# Patient Record
Sex: FEMALE | Race: WHITE | Employment: OTHER | URBAN - METROPOLITAN AREA
[De-identification: names, ages, dates, MRNs, and addresses within clinical notes are randomized per-mention and may not be internally consistent; named-entity substitution may affect disease eponyms.]

---

## 2020-06-20 ENCOUNTER — APPOINTMENT (OUTPATIENT)
Dept: GENERAL RADIOLOGY | Age: 55
DRG: 690 | End: 2020-06-20
Payer: COMMERCIAL

## 2020-06-20 ENCOUNTER — HOSPITAL ENCOUNTER (INPATIENT)
Age: 55
LOS: 2 days | Discharge: HOME | DRG: 690 | End: 2020-06-23
Attending: INTERNAL MEDICINE | Admitting: INTERNAL MEDICINE
Payer: COMMERCIAL

## 2020-06-20 ENCOUNTER — APPOINTMENT (OUTPATIENT)
Dept: CT IMAGING | Age: 55
DRG: 690 | End: 2020-06-20
Payer: COMMERCIAL

## 2020-06-20 LAB
A/G RATIO: 0.8 (ref 1.1–2.2)
ALBUMIN SERPL-MCNC: 2.9 G/DL (ref 3.4–5)
ALP BLD-CCNC: 116 U/L (ref 40–129)
ALT SERPL-CCNC: 12 U/L (ref 10–40)
ANION GAP SERPL CALCULATED.3IONS-SCNC: 11 MMOL/L (ref 3–16)
ANISOCYTOSIS: ABNORMAL
AST SERPL-CCNC: 18 U/L (ref 15–37)
BACTERIA: ABNORMAL /HPF
BANDED NEUTROPHILS RELATIVE PERCENT: 4 % (ref 0–7)
BASOPHILS ABSOLUTE: 0 K/UL (ref 0–0.2)
BASOPHILS RELATIVE PERCENT: 0 %
BILIRUB SERPL-MCNC: 0.9 MG/DL (ref 0–1)
BILIRUBIN URINE: NEGATIVE
BLOOD, URINE: ABNORMAL
BUN BLDV-MCNC: 16 MG/DL (ref 7–20)
CALCIUM SERPL-MCNC: 8.2 MG/DL (ref 8.3–10.6)
CHLORIDE BLD-SCNC: 99 MMOL/L (ref 99–110)
CLARITY: ABNORMAL
CO2: 21 MMOL/L (ref 21–32)
COLOR: ABNORMAL
CREAT SERPL-MCNC: 0.7 MG/DL (ref 0.6–1.1)
EOSINOPHILS ABSOLUTE: 0 K/UL (ref 0–0.6)
EOSINOPHILS RELATIVE PERCENT: 0 %
EPITHELIAL CELLS, UA: ABNORMAL /HPF (ref 0–5)
GFR AFRICAN AMERICAN: >60
GFR NON-AFRICAN AMERICAN: >60
GLOBULIN: 3.8 G/DL
GLUCOSE BLD-MCNC: 219 MG/DL (ref 70–99)
GLUCOSE URINE: NEGATIVE MG/DL
HCT VFR BLD CALC: 42.4 % (ref 36–48)
HEMOGLOBIN: 14.2 G/DL (ref 12–16)
HYALINE CASTS: ABNORMAL /LPF (ref 0–2)
KETONES, URINE: NEGATIVE MG/DL
LACTIC ACID, SEPSIS: 1.1 MMOL/L (ref 0.4–1.9)
LEUKOCYTE ESTERASE, URINE: ABNORMAL
LYMPHOCYTES ABSOLUTE: 0.5 K/UL (ref 1–5.1)
LYMPHOCYTES RELATIVE PERCENT: 4 %
MAGNESIUM: 1.9 MG/DL (ref 1.8–2.4)
MCH RBC QN AUTO: 30.5 PG (ref 26–34)
MCHC RBC AUTO-ENTMCNC: 33.4 G/DL (ref 31–36)
MCV RBC AUTO: 91.2 FL (ref 80–100)
MICROSCOPIC EXAMINATION: YES
MONOCYTES ABSOLUTE: 1.2 K/UL (ref 0–1.3)
MONOCYTES RELATIVE PERCENT: 9 %
NEUTROPHILS ABSOLUTE: 11.1 K/UL (ref 1.7–7.7)
NEUTROPHILS RELATIVE PERCENT: 83 %
NITRITE, URINE: POSITIVE
PDW BLD-RTO: 12.7 % (ref 12.4–15.4)
PH UA: 6 (ref 5–8)
PLATELET # BLD: 81 K/UL (ref 135–450)
PLATELET SLIDE REVIEW: ABNORMAL
PMV BLD AUTO: 9.1 FL (ref 5–10.5)
POTASSIUM SERPL-SCNC: 3.2 MMOL/L (ref 3.5–5.1)
PROTEIN UA: 100 MG/DL
RBC # BLD: 4.65 M/UL (ref 4–5.2)
RBC UA: ABNORMAL /HPF (ref 0–4)
SLIDE REVIEW: ABNORMAL
SODIUM BLD-SCNC: 131 MMOL/L (ref 136–145)
SPECIFIC GRAVITY UA: 1.02 (ref 1–1.03)
TOTAL PROTEIN: 6.7 G/DL (ref 6.4–8.2)
TROPONIN: <0.01 NG/ML
URINE REFLEX TO CULTURE: YES
URINE TYPE: ABNORMAL
UROBILINOGEN, URINE: 0.2 E.U./DL
WBC # BLD: 12.8 K/UL (ref 4–11)
WBC UA: ABNORMAL /HPF (ref 0–5)

## 2020-06-20 PROCEDURE — 84443 ASSAY THYROID STIM HORMONE: CPT

## 2020-06-20 PROCEDURE — 2580000003 HC RX 258: Performed by: PHYSICIAN ASSISTANT

## 2020-06-20 PROCEDURE — 83605 ASSAY OF LACTIC ACID: CPT

## 2020-06-20 PROCEDURE — 96374 THER/PROPH/DIAG INJ IV PUSH: CPT

## 2020-06-20 PROCEDURE — 99291 CRITICAL CARE FIRST HOUR: CPT

## 2020-06-20 PROCEDURE — 84145 PROCALCITONIN (PCT): CPT

## 2020-06-20 PROCEDURE — 93005 ELECTROCARDIOGRAM TRACING: CPT | Performed by: EMERGENCY MEDICINE

## 2020-06-20 PROCEDURE — 85025 COMPLETE CBC W/AUTO DIFF WBC: CPT

## 2020-06-20 PROCEDURE — 71045 X-RAY EXAM CHEST 1 VIEW: CPT

## 2020-06-20 PROCEDURE — 81001 URINALYSIS AUTO W/SCOPE: CPT

## 2020-06-20 PROCEDURE — 87077 CULTURE AEROBIC IDENTIFY: CPT

## 2020-06-20 PROCEDURE — 83735 ASSAY OF MAGNESIUM: CPT

## 2020-06-20 PROCEDURE — 6360000002 HC RX W HCPCS: Performed by: PHYSICIAN ASSISTANT

## 2020-06-20 PROCEDURE — 74176 CT ABD & PELVIS W/O CONTRAST: CPT

## 2020-06-20 PROCEDURE — 80053 COMPREHEN METABOLIC PANEL: CPT

## 2020-06-20 PROCEDURE — 87186 SC STD MICRODIL/AGAR DIL: CPT

## 2020-06-20 PROCEDURE — 96375 TX/PRO/DX INJ NEW DRUG ADDON: CPT

## 2020-06-20 PROCEDURE — 96361 HYDRATE IV INFUSION ADD-ON: CPT

## 2020-06-20 PROCEDURE — 87086 URINE CULTURE/COLONY COUNT: CPT

## 2020-06-20 PROCEDURE — 84484 ASSAY OF TROPONIN QUANT: CPT

## 2020-06-20 RX ORDER — 0.9 % SODIUM CHLORIDE 0.9 %
1000 INTRAVENOUS SOLUTION INTRAVENOUS ONCE
Status: COMPLETED | OUTPATIENT
Start: 2020-06-20 | End: 2020-06-21

## 2020-06-20 RX ORDER — POTASSIUM CHLORIDE 20 MEQ/1
40 TABLET, EXTENDED RELEASE ORAL ONCE
Status: COMPLETED | OUTPATIENT
Start: 2020-06-20 | End: 2020-06-21

## 2020-06-20 RX ORDER — MELOXICAM 15 MG/1
15 TABLET ORAL DAILY
COMMUNITY

## 2020-06-20 RX ORDER — PREDNISONE 20 MG/1
20 TABLET ORAL DAILY
Status: ON HOLD | COMMUNITY
End: 2020-06-23 | Stop reason: HOSPADM

## 2020-06-20 RX ORDER — DIPHENHYDRAMINE HCL 25 MG
25 CAPSULE ORAL NIGHTLY PRN
COMMUNITY

## 2020-06-20 RX ORDER — ONDANSETRON 2 MG/ML
4 INJECTION INTRAMUSCULAR; INTRAVENOUS EVERY 30 MIN PRN
Status: DISCONTINUED | OUTPATIENT
Start: 2020-06-20 | End: 2020-06-21 | Stop reason: HOSPADM

## 2020-06-20 RX ADMIN — Medication 1 G: at 23:58

## 2020-06-20 RX ADMIN — SODIUM CHLORIDE 1000 ML: 9 INJECTION, SOLUTION INTRAVENOUS at 22:10

## 2020-06-20 RX ADMIN — SODIUM CHLORIDE 1000 ML: 9 INJECTION, SOLUTION INTRAVENOUS at 23:58

## 2020-06-20 RX ADMIN — ONDANSETRON 4 MG: 2 INJECTION INTRAMUSCULAR; INTRAVENOUS at 22:10

## 2020-06-20 ASSESSMENT — PAIN SCALES - GENERAL: PAINLEVEL_OUTOF10: 10

## 2020-06-21 PROBLEM — E87.1 HYPONATREMIA: Status: ACTIVE | Noted: 2020-06-21

## 2020-06-21 PROBLEM — E87.6 HYPOKALEMIA: Status: ACTIVE | Noted: 2020-06-21

## 2020-06-21 PROBLEM — R53.1 GENERALIZED WEAKNESS: Status: ACTIVE | Noted: 2020-06-21

## 2020-06-21 PROBLEM — R82.90 ABNORMAL URINALYSIS: Status: ACTIVE | Noted: 2020-06-21

## 2020-06-21 PROBLEM — D72.829 LEUKOCYTOSIS: Status: ACTIVE | Noted: 2020-06-21

## 2020-06-21 PROBLEM — K52.9 ACUTE GASTROENTERITIS: Status: ACTIVE | Noted: 2020-06-21

## 2020-06-21 LAB
ALBUMIN SERPL-MCNC: 2.9 G/DL (ref 3.4–5)
ANION GAP SERPL CALCULATED.3IONS-SCNC: 6 MMOL/L (ref 3–16)
BUN BLDV-MCNC: 10 MG/DL (ref 7–20)
C DIFF TOXIN/ANTIGEN: NORMAL
CALCIUM SERPL-MCNC: 8.4 MG/DL (ref 8.3–10.6)
CHLORIDE BLD-SCNC: 106 MMOL/L (ref 99–110)
CO2: 25 MMOL/L (ref 21–32)
CREAT SERPL-MCNC: 0.8 MG/DL (ref 0.6–1.1)
EKG ATRIAL RATE: 128 BPM
EKG DIAGNOSIS: NORMAL
EKG P AXIS: 22 DEGREES
EKG P-R INTERVAL: 140 MS
EKG Q-T INTERVAL: 308 MS
EKG QRS DURATION: 106 MS
EKG QTC CALCULATION (BAZETT): 449 MS
EKG R AXIS: -58 DEGREES
EKG T AXIS: 55 DEGREES
EKG VENTRICULAR RATE: 128 BPM
GFR AFRICAN AMERICAN: >60
GFR NON-AFRICAN AMERICAN: >60
GI BACTERIAL PATHOGENS BY PCR: NORMAL
GLUCOSE BLD-MCNC: 106 MG/DL (ref 70–99)
HCT VFR BLD CALC: 40.7 % (ref 36–48)
HEMOGLOBIN: 13.6 G/DL (ref 12–16)
MAGNESIUM: 1.9 MG/DL (ref 1.8–2.4)
MAGNESIUM: 2.1 MG/DL (ref 1.8–2.4)
MCH RBC QN AUTO: 30.9 PG (ref 26–34)
MCHC RBC AUTO-ENTMCNC: 33.5 G/DL (ref 31–36)
MCV RBC AUTO: 92.2 FL (ref 80–100)
PDW BLD-RTO: 13.3 % (ref 12.4–15.4)
PHOSPHORUS: 2.2 MG/DL (ref 2.5–4.9)
PLATELET # BLD: 82 K/UL (ref 135–450)
PMV BLD AUTO: 9 FL (ref 5–10.5)
POTASSIUM SERPL-SCNC: 4.3 MMOL/L (ref 3.5–5.1)
PROCALCITONIN: 4.06 NG/ML (ref 0–0.15)
PROCALCITONIN: 5.61 NG/ML (ref 0–0.15)
RBC # BLD: 4.41 M/UL (ref 4–5.2)
REPORT: NORMAL
SODIUM BLD-SCNC: 137 MMOL/L (ref 136–145)
TSH REFLEX: 0.82 UIU/ML (ref 0.27–4.2)
WBC # BLD: 13.5 K/UL (ref 4–11)

## 2020-06-21 PROCEDURE — 93010 ELECTROCARDIOGRAM REPORT: CPT | Performed by: INTERNAL MEDICINE

## 2020-06-21 PROCEDURE — 6370000000 HC RX 637 (ALT 250 FOR IP): Performed by: NURSE PRACTITIONER

## 2020-06-21 PROCEDURE — 6360000002 HC RX W HCPCS: Performed by: INTERNAL MEDICINE

## 2020-06-21 PROCEDURE — 2580000003 HC RX 258: Performed by: NURSE PRACTITIONER

## 2020-06-21 PROCEDURE — 87040 BLOOD CULTURE FOR BACTERIA: CPT

## 2020-06-21 PROCEDURE — 87150 DNA/RNA AMPLIFIED PROBE: CPT

## 2020-06-21 PROCEDURE — 87186 SC STD MICRODIL/AGAR DIL: CPT

## 2020-06-21 PROCEDURE — 6370000000 HC RX 637 (ALT 250 FOR IP): Performed by: INTERNAL MEDICINE

## 2020-06-21 PROCEDURE — 99255 IP/OBS CONSLTJ NEW/EST HI 80: CPT | Performed by: INTERNAL MEDICINE

## 2020-06-21 PROCEDURE — 80069 RENAL FUNCTION PANEL: CPT

## 2020-06-21 PROCEDURE — 6360000002 HC RX W HCPCS: Performed by: NURSE PRACTITIONER

## 2020-06-21 PROCEDURE — 85027 COMPLETE CBC AUTOMATED: CPT

## 2020-06-21 PROCEDURE — 87324 CLOSTRIDIUM AG IA: CPT

## 2020-06-21 PROCEDURE — 87505 NFCT AGENT DETECTION GI: CPT

## 2020-06-21 PROCEDURE — 84145 PROCALCITONIN (PCT): CPT

## 2020-06-21 PROCEDURE — 83735 ASSAY OF MAGNESIUM: CPT

## 2020-06-21 PROCEDURE — 87449 NOS EACH ORGANISM AG IA: CPT

## 2020-06-21 PROCEDURE — 36415 COLL VENOUS BLD VENIPUNCTURE: CPT

## 2020-06-21 PROCEDURE — 1200000000 HC SEMI PRIVATE

## 2020-06-21 PROCEDURE — 6370000000 HC RX 637 (ALT 250 FOR IP): Performed by: PHYSICIAN ASSISTANT

## 2020-06-21 RX ORDER — SODIUM CHLORIDE 9 MG/ML
INJECTION, SOLUTION INTRAVENOUS CONTINUOUS
Status: CANCELLED | OUTPATIENT
Start: 2020-06-21

## 2020-06-21 RX ORDER — ACETAMINOPHEN 650 MG/1
650 SUPPOSITORY RECTAL EVERY 6 HOURS PRN
Status: DISCONTINUED | OUTPATIENT
Start: 2020-06-21 | End: 2020-06-23 | Stop reason: HOSPADM

## 2020-06-21 RX ORDER — 0.9 % SODIUM CHLORIDE 0.9 %
2000 INTRAVENOUS SOLUTION INTRAVENOUS ONCE
Status: DISCONTINUED | OUTPATIENT
Start: 2020-06-21 | End: 2020-06-21

## 2020-06-21 RX ORDER — LACTOBACILLUS RHAMNOSUS GG 10B CELL
1 CAPSULE ORAL 2 TIMES DAILY WITH MEALS
Status: DISCONTINUED | OUTPATIENT
Start: 2020-06-21 | End: 2020-06-23 | Stop reason: HOSPADM

## 2020-06-21 RX ORDER — SODIUM CHLORIDE 0.9 % (FLUSH) 0.9 %
10 SYRINGE (ML) INJECTION EVERY 12 HOURS SCHEDULED
Status: DISCONTINUED | OUTPATIENT
Start: 2020-06-21 | End: 2020-06-23 | Stop reason: HOSPADM

## 2020-06-21 RX ORDER — POTASSIUM CHLORIDE AND SODIUM CHLORIDE 900; 300 MG/100ML; MG/100ML
INJECTION, SOLUTION INTRAVENOUS CONTINUOUS
Status: DISPENSED | OUTPATIENT
Start: 2020-06-21 | End: 2020-06-22

## 2020-06-21 RX ORDER — PROMETHAZINE HYDROCHLORIDE 25 MG/1
12.5 TABLET ORAL EVERY 6 HOURS PRN
Status: DISCONTINUED | OUTPATIENT
Start: 2020-06-21 | End: 2020-06-23 | Stop reason: HOSPADM

## 2020-06-21 RX ORDER — ACETAMINOPHEN 325 MG/1
650 TABLET ORAL EVERY 6 HOURS PRN
Status: DISCONTINUED | OUTPATIENT
Start: 2020-06-21 | End: 2020-06-23 | Stop reason: HOSPADM

## 2020-06-21 RX ORDER — POLYETHYLENE GLYCOL 3350 17 G/17G
17 POWDER, FOR SOLUTION ORAL DAILY PRN
Status: DISCONTINUED | OUTPATIENT
Start: 2020-06-21 | End: 2020-06-23 | Stop reason: HOSPADM

## 2020-06-21 RX ORDER — SODIUM CHLORIDE 0.9 % (FLUSH) 0.9 %
10 SYRINGE (ML) INJECTION PRN
Status: DISCONTINUED | OUTPATIENT
Start: 2020-06-21 | End: 2020-06-23 | Stop reason: HOSPADM

## 2020-06-21 RX ORDER — ONDANSETRON 2 MG/ML
4 INJECTION INTRAMUSCULAR; INTRAVENOUS EVERY 6 HOURS PRN
Status: DISCONTINUED | OUTPATIENT
Start: 2020-06-21 | End: 2020-06-23 | Stop reason: HOSPADM

## 2020-06-21 RX ADMIN — ONDANSETRON 4 MG: 2 INJECTION INTRAMUSCULAR; INTRAVENOUS at 12:39

## 2020-06-21 RX ADMIN — Medication 1 CAPSULE: at 17:29

## 2020-06-21 RX ADMIN — ENOXAPARIN SODIUM 40 MG: 40 INJECTION SUBCUTANEOUS at 08:23

## 2020-06-21 RX ADMIN — POTASSIUM CHLORIDE AND SODIUM CHLORIDE: 900; 300 INJECTION, SOLUTION INTRAVENOUS at 02:00

## 2020-06-21 RX ADMIN — POTASSIUM CHLORIDE 40 MEQ: 1500 TABLET, EXTENDED RELEASE ORAL at 00:43

## 2020-06-21 RX ADMIN — CEFTRIAXONE 2 G: 2 INJECTION, POWDER, FOR SOLUTION INTRAMUSCULAR; INTRAVENOUS at 17:30

## 2020-06-21 RX ADMIN — ACETAMINOPHEN 650 MG: 325 TABLET, FILM COATED ORAL at 15:59

## 2020-06-21 RX ADMIN — POTASSIUM CHLORIDE AND SODIUM CHLORIDE: 900; 300 INJECTION, SOLUTION INTRAVENOUS at 10:00

## 2020-06-21 ASSESSMENT — ENCOUNTER SYMPTOMS
RHINORRHEA: 0
FACIAL SWELLING: 0
APNEA: 0
BLOOD IN STOOL: 0
DIARRHEA: 1
NAUSEA: 0
COLOR CHANGE: 0
EYE REDNESS: 0
EYE DISCHARGE: 0
PHOTOPHOBIA: 0
COUGH: 0
TROUBLE SWALLOWING: 0
SHORTNESS OF BREATH: 0
ABDOMINAL PAIN: 0
CHEST TIGHTNESS: 0
CHOKING: 0
STRIDOR: 0

## 2020-06-21 ASSESSMENT — PAIN SCALES - GENERAL
PAINLEVEL_OUTOF10: 8
PAINLEVEL_OUTOF10: 0

## 2020-06-21 ASSESSMENT — PAIN DESCRIPTION - PAIN TYPE: TYPE: ACUTE PAIN

## 2020-06-21 NOTE — ED PROVIDER NOTES
[facility]        Pt Name: Christal Bains  MRN: [id number]  [place] 1965  Date of evaluation: 6/20/2020  Provider: Dmitri Monzon PA-C  PCP: Jessica Langford MD    Evaluation by OLIVIER. My supervising physician was available for consultation. The total critical care time spent while evaluating and treating this patient was at least 32 minutes. This excludes time spent doing separately billable procedures. This includes time at the bedside, data interpretation, medication management, obtaining critical history from collateral sources if the patient is unable to provide it directly, and physician consultation. Specifics of interventions taken and potentially life-threatening diagnostic considerations are listed above in the medical decision making. CHIEF COMPLAINT       Chief Complaint   Patient presents with    Fatigue     pt states on prednisone for poison ivy, very allergic, has neause, dehydrations, dry mouth and generally feels fatigued and sick       HISTORY OF PRESENT ILLNESS   (Location, Timing/Onset, Context/Setting, Quality, Duration, Modifying Factors, Severity, Associated Signs and Symptoms)  Note limiting factors. Christal Bains is a 60 y.o. female that presents emergency department with a chief complaint of nausea, dry mouth, polydipsia, general fatigue, chills and diarrhea. Patient states last weekend she was camping and got poison ivy and was started on prednisone, Benadryl and Pepcid 5 days ago. She states she stopped taking the prednisone yesterday because for the past 3 days she has been having the symptoms. She states she has had decreased appetite. She denies chest pain, shortness of breath, cough, known fevers, dysuria, hematuria, bloody stool, abdominal pain or any associated pain. States she is just feeling fatigued. She states that her blood sugar in squad was 221. She is not currently on medication for diabetes.   Denies any other symptoms. Nursing Notes were all reviewed and agreed with or any disagreements were addressed in the HPI. REVIEW OF SYSTEMS    (2-9 systems for level 4, 10 or more for level 5)     Review of Systems    Positives and Pertinent negatives as per HPI. Except as noted above in the ROS, all other systems were reviewed and negative. PAST MEDICAL HISTORY   History reviewed. No pertinent past medical history. SURGICAL HISTORY   History reviewed. No pertinent surgical history. [address]       Current Discharge Medication List      CONTINUE these medications which have NOT CHANGED    Details   predniSONE (DELTASONE) 20 MG tablet Take 20 mg by mouth daily      meloxicam (MOBIC) 15 MG tablet Take 15 mg by mouth daily      diphenhydrAMINE (BENADRYL) 25 MG capsule Take 25 mg by mouth nightly as needed for Itching      NONFORMULARY Cream for posion ivy               ALLERGIES     Codeine    FAMILYHISTORY     History reviewed. No pertinent family history. SOCIAL HISTORY       Social History     Tobacco Use    Smoking status: Never Smoker    Smokeless tobacco: Never Used   Substance Use Topics    Alcohol use: Not Currently    Drug use: Never       SCREENINGS    [place] Coma Scale  Eye Opening: Spontaneous  Best Verbal Response: Oriented  Best Motor Response: Obeys commands  Letty Coma Scale Score: 15        PHYSICAL EXAM    (up to 7 for level 4, 8 or more for level 5)     ED Triage Vitals [06/20/20 2139]   BP Temp Temp Source Pulse Resp SpO2 Height Weight   (!) 152/68 99.6 °F (37.6 °C) Oral 132 18 95 % 5' 8\" (1.727 m) 230 lb (104.3 kg)       Physical Exam  Vitals signs and nursing note reviewed. Constitutional:       Appearance: She is well-developed. She is not diaphoretic. HENT:      Head: Atraumatic. Nose: Nose normal.      Mouth/Throat:      Mouth: Mucous membranes are dry. Eyes:      General:         Right eye: No discharge. Left eye: No discharge.    Neck: Musculoskeletal: Normal range of motion. Cardiovascular:      Rate and Rhythm: Regular rhythm. Tachycardia present. Heart sounds: No murmur. No friction rub. No gallop. Pulmonary:      Effort: Pulmonary effort is normal. No respiratory distress. Breath sounds: No stridor. No wheezing, rhonchi or rales. Abdominal:      General: Bowel sounds are normal. There is no distension. Palpations: Abdomen is soft. There is no mass. Tenderness: There is no abdominal tenderness. There is no guarding or rebound. Hernia: No hernia is present. Musculoskeletal: Normal range of motion. General: No swelling. Skin:     General: Skin is warm and dry. Findings: No erythema or rash. Neurological:      Mental Status: She is alert and oriented to person, place, and time. Cranial Nerves: No cranial nerve deficit.    Psychiatric:         Behavior: Behavior normal.         DIAGNOSTIC RESULTS   LABS:    Labs Reviewed   CBC WITH AUTO DIFFERENTIAL - Abnormal; Notable for the following components:       Result Value    WBC 12.8 (*)     Platelets 81 (*)     Neutrophils Absolute 11.1 (*)     Lymphocytes Absolute 0.5 (*)     Anisocytosis Occasional (*)     All other components within normal limits    Narrative:     Performed at:  [facility]  [address] [facility], [address]   Phone [phone]   COMPREHENSIVE METABOLIC PANEL - Abnormal; Notable for the following components:    Sodium 131 (*)     Potassium 3.2 (*)     Glucose 219 (*)     Calcium 8.2 (*)     Alb 2.9 (*)     Albumin/Globulin Ratio 0.8 (*)     All other components within normal limits    Narrative:     Performed at:  [facility]  [address] [facility], [address]   Phone [phone]   URINE RT REFLEX TO CULTURE - Abnormal; Notable for the following components:    Blood, Urine MODERATE (*)     Protein,  (*)     Nitrite, Urine POSITIVE (*) Leukocyte Esterase, Urine SMALL (*)     All other components within normal limits    Narrative:     Performed at:  [facility] [address] [address] [place]   Phone [phone]   MICROSCOPIC URINALYSIS - Abnormal; Notable for the following components:    WBC, UA 10-20 (*)     RBC, UA 5-10 (*)     Bacteria, UA 1+ (*)     All other components within normal limits    Narrative:     Performed at:  [facility] [address] [address] [place]   Phone [phone]   PROCALCITONIN - Abnormal; Notable for the following components:    Procalcitonin 4.06 (*)     All other components within normal limits    Narrative:     Performed at:  [facility] [address] [address] [place]   Phone [phone]   CULTURE, URINE   CULTURE, BLOOD 2   CULTURE, BLOOD 1   GASTROINTESTINAL PANEL, MOLECULAR   C DIFF TOXIN/ANTIGEN   LACTATE, SEPSIS    Narrative:     Performed at:  [facility] [address] [address] [place]   Phone [phone]   TROPONIN    Narrative:     Performed at:  [facility] [address], [place], [address] [place]   Phone [phone]   MAGNESIUM    Narrative:     Performed at:  [facility] [address] [place] [address] [place]   Phone [phone]   MAGNESIUM    Narrative:     Performed at:  [facility] [address], [place], [address] [place]   Phone [phone]   TSH WITH REFLEX   CBC   RENAL FUNCTION PANEL   MAGNESIUM   PROCALCITONIN       All other labs were within normal range or not returned as of this dictation. EKG: All EKG's are interpreted by the Emergency Department Physician in the absence of a cardiologist.  Please see their note for interpretation of EKG.       RADIOLOGY:   Non-plain film images such as CT, Ultrasound and MRI are read by the radiologist. Raf Handing radiographic images are visualized and preliminarily interpreted by the ED Provider with the below findings:        Interpretation per the Radiologist below, if available at the time of this note:    CT ABDOMEN PELVIS WO CONTRAST   Preliminary Result   Bilateral perinephric edema and prominence of the renal collecting systems   right greater than left without obstructing lesion or calculus. Findings may   represent pyelonephritis. Please correlate with urinalysis and CBC. 9   diverticulosis without evidence of diverticulitis. XR CHEST PORTABLE   Final Result   Prominence central vessels likely magnified by technique. No focal airspace consolidation.                  PROCEDURES   Unless otherwise noted below, none     Procedures    CRITICAL CARE TIME   N/A    CONSULTS: Dr. Ruthie Herrmann and DIFFERENTIAL DIAGNOSIS/MDM:   Vitals:    Vitals:    06/20/20 2330 06/21/20 0000 06/21/20 0030 06/21/20 0100   BP: (!) 90/59 (!) 100/47 (!) 102/52 114/77   Pulse: 113 108 103 101   Resp: 22 19 20 20   Temp:    98.4 °F (36.9 °C)   TempSrc:    Oral   SpO2: 95% 95% 95% 94%   Weight:    229 lb 3 oz (104 kg)   Height:    5' 8\" (1.727 m)       Patient was given the following medications:  Medications   sodium chloride flush 0.9 % injection 10 mL (has no administration in time range)   sodium chloride flush 0.9 % injection 10 mL (has no administration in time range)   acetaminophen (TYLENOL) tablet 650 mg (has no administration in time range)     Or   acetaminophen (TYLENOL) suppository 650 mg (has no administration in time range)   polyethylene glycol (GLYCOLAX) packet 17 g (has no administration in time range)   promethazine (PHENERGAN) tablet 12.5 mg (has no administration in time range)     Or   ondansetron (ZOFRAN) injection 4 mg (has no administration in time range)   enoxaparin (LOVENOX) injection 40 mg (has no administration in time range) DISPOSITION/PLAN   DISPOSITION Admitted 06/21/2020 12:57:27 AM      PATIENT REFERREDTO:  Yuli Sepulveda MD  [address] [place] [address],[address] [facility] [address] [id number] [phone]            DISCHARGE MEDICATIONS:  Current Discharge Medication List          DISCONTINUED MEDICATIONS:  Current Discharge Medication List                 (Please note that portions of this note were completed with a voice recognition program.  Efforts were made to edit the dictations but occasionally words are mis-transcribed.)    Kris Weaver PA-C (electronically signed)            Kris Weaver PA-C  06/21/20 0231

## 2020-06-21 NOTE — CONSULTS
Infectious Diseases   Consult Note        Admission Date: 6/20/2020  Hospital Day: Hospital Day: 2   Attending: Anyi Evangelista MD  Date of service: 6/21/20     Reason for admission: Acute gastroenteritis [K52.9]  Acute gastroenteritis [K52.9]  Acute gastroenteritis [K52.9]  Acute gastroenteritis [K52.9]    Chief complaint/ Reason for consult: Gastroenteritis, gram-negative bacteremia    Microbiology:        I have reviewed allavailable micro lab data and cultures    Blood culture (1/2) - collected on 6/21/2020: E. coli    Antibiotics and immunizations:       Current antibiotics: All antibiotics and their doses were reviewed by me    Recent Abx Admin                   cefTRIAXone (ROCEPHIN) 2 g IVPB in D5W 50ml minibag (g) 2 g New Bag 06/21/20 1730    cefTRIAXone (ROCEPHIN) 1 g in sterile water 10 mL IV syringe (g) 1 g Given 06/20/20 8069                  Immunization History: All immunization history was reviewed by me today. There is no immunization history on file for this patient. Known drug allergies: All allergies were reviewed and updated    Allergies   Allergen Reactions    Codeine Swelling       Social history:     Social History:  All social andepidemiologic history was reviewed and updated by me today as needed. · Tobacco use:   reports that she has never smoked. She has never used smokeless tobacco.  · Alcohol use:   reports previous alcohol use. · Currently lives in: [address] [place] [address]  ·  reports no history of drug use. Assessment:     The patient is a 60 y.o. old female who  has no past medical history on file. with following problems:    · Fever and bandemia  · E. coli bacteremia  · Thrombocytopenia, believed related to infection  · Elevated procalcitonin  · Bilateral perinephric edema concerning for possible pyelonephritis  · Diverticulosis without diverticulitis  · Obesity Class 1 due to excess calorie intake : Body mass index is 34.85 kg/m².         Discussion: The patient is having generalized weakness, dehydration symptoms for about 1 week and was having diarrhea for few days. In the ER, she was found to be febrile with white cell count of 12,800. White cell count is gone up to 13,500 today    Urine culture is in process. Stool GI PCR was negative. Stool C. difficile is negative    I reviewed the chest x-ray. No obvious lung infiltrate      Plan:     Diagnostic Workup:    · Will follow-up on susceptibility of E. coli isolated from the blood culture  · We will follow-up on urine culture  · Continue to follow fever curve, WBC count and blood cultures  · Follow up on liverand renal functions closely    Antimicrobials:    · Will continue IV ceftriaxone  · Agree with increasing IV ceftriaxone dose to 2 g every 24 hour  · Monitor monitor her vitals closely  · We will follow-up on the clinical progress and susceptibility data and will make further changes in the antibiotics accordingly  · Fall precaution  · Continue to watch for new fever or diarrhea  · Maintain good glycemic control  · DVT prophylaxis  · Discussed the above plan with patient and RN       Drug Monitoring:    · Continue serial monitoring for antibiotic toxicity as follows: CBC, CMP  · Continue to watch for following: new or worsening fever, hypotension, hives, lip swelling and redness or purulence at vascular access sites. I/v access Management:    · Continue to monitor i.v access sites for erythema, induration, discharge or tenderness. · As always, continue efforts to minimizetubes/lines/drains as clinically appropriate to reduce chances of line associated infections. Current isolation precautions: There are no current isolations documented for this patient. Level of complexity of consult: High     Risk of Complications/Morbidity: High     · Illness(es)/ Infection present that pose threat to life/bodily function.    · There is potential for severe exacerbation of infection/side effects of treatment. · Therapy requires intensive monitoring for antimicrobial agent toxicity. Thank you for involving me in the care of your patient. I will continue to follow. If you have any additional questions, please do not hesitate to contact me. Subjective:     Presenting complaint in ER:     Chief Complaint   Patient presents with    Fatigue     pt states on prednisone for poison ivy, very allergic, has neause, dehydrations, dry mouth and generally feels fatigued and sick        HPI: Dominique Harper is a 60 y.o. female patient, who was seen at the request of Dr. Vibha Scott MD.    History was obtained from chart review and the patient. The patient was admitted on 6/20/2020. I have been consulted to see the patient for above mentioned reason(s). The patient has multiple medical comorbidities, and presented to the ER for nausea, generalized fatigue, chills with diarrhea. The patient was camping last weekend and was exposed to poison ivy and apparently started prednisone and Benadryl and Pepcid for few days. She was having diarrhea for few days before presentation to the ER. In the ER, she was noted to have a low-grade fever of 99.6 and white cell count of 13,800. She was admitted. Blood cultures were sent. Stool GI PCR was sent which came back negative. Once her blood culture came back positive for E. coli    I have been asked for my opinion for management for this patient. Past Medical History: All past medical history reviewed today. History reviewed. No pertinent past medical history. Past Surgical History: All pastsurgical history was reviewed today. History reviewed. No pertinent surgical history. Family History: All family history was reviewed today. History reviewed. No pertinent family history. Medications: All current and past medications were reviewed.     Medications Prior to Admission: predniSONE (DELTASONE) 20 MG tablet, Take 20 mg by mouth daily  meloxicam (MOBIC) 15 MG tablet, Take 15 mg by mouth daily  diphenhydrAMINE (BENADRYL) 25 MG capsule, Take 25 mg by mouth nightly as needed for Itching  NONFORMULARY, Cream for posion ivy     sodium chloride flush  10 mL Intravenous 2 times per day    enoxaparin  40 mg Subcutaneous Daily    cefTRIAXone (ROCEPHIN) IV  2 g Intravenous Q24H    lactobacillus  1 capsule Oral BID WC          REVIEW OF SYSTEMS:       Review of Systems   Constitutional: Positive for chills, fatigue and fever. Negative for diaphoresis and unexpected weight change. HENT: Negative for congestion, ear discharge, ear pain, facial swelling, hearing loss, rhinorrhea and trouble swallowing. Eyes: Negative for photophobia, discharge, redness and visual disturbance. Respiratory: Negative for apnea, cough, choking, chest tightness, shortness of breath and stridor. Cardiovascular: Negative for chest pain and palpitations. Gastrointestinal: Positive for diarrhea. Negative for abdominal pain, blood in stool and nausea. Endocrine: Negative for polydipsia, polyphagia and polyuria. Genitourinary: Positive for dysuria. Negative for difficulty urinating, frequency, hematuria, menstrual problem and vaginal discharge. Musculoskeletal: Negative for arthralgias, joint swelling, myalgias and neck stiffness. Skin: Negative for color change and rash. Allergic/Immunologic: Negative for immunocompromised state. Neurological: Negative for dizziness, seizures, speech difficulty, light-headedness and headaches. Hematological: Negative for adenopathy. Psychiatric/Behavioral: Negative for agitation, hallucinations and suicidal ideas.          Objective:       PHYSICAL EXAM:      Vitals:   Vitals:    06/21/20 0815 06/21/20 1230 06/21/20 1503 06/21/20 1932   BP: 119/76 122/80 128/80 120/75   Pulse: 103 99 96 93   Resp: 16 16 16 18   Temp: 98 °F (36.7 °C) 98.6 °F (37 °C) 98.7 °F (37.1 °C) 99.3 °F (37.4 °C)   TempSrc: Oral Oral  Oral   SpO2: 95% 98% 96% 98%   Weight:       Height:           Physical Exam  Vitals signs and nursing note reviewed. Constitutional:       General: She is not in acute distress. Appearance: She is well-developed. She is not diaphoretic. HENT:      Head: Normocephalic. Right Ear: External ear normal.      Left Ear: External ear normal.      Nose: Nose normal.   Eyes:      General: No scleral icterus. Right eye: No discharge. Left eye: No discharge. Conjunctiva/sclera: Conjunctivae normal.      Pupils: Pupils are equal, round, and reactive to light. Neck:      Musculoskeletal: Normal range of motion and neck supple. Cardiovascular:      Rate and Rhythm: Normal rate and regular rhythm. Heart sounds: No murmur. No friction rub. Pulmonary:      Effort: Pulmonary effort is normal.      Breath sounds: No stridor. No wheezing or rales. Chest:      Chest wall: No tenderness. Abdominal:      Palpations: Abdomen is soft. There is no mass. Tenderness: There is no abdominal tenderness. There is no guarding or rebound. Musculoskeletal:         General: No tenderness. Lymphadenopathy:      Cervical: No cervical adenopathy. Skin:     General: Skin is warm and dry. Findings: No erythema or rash. Neurological:      Mental Status: She is alert and oriented to person, place, and time. Motor: No abnormal muscle tone. Psychiatric:         Judgment: Judgment normal.           Lines: All vascular access sites are healthy with no local erythema, discharge or tenderness. Intake and output:     I/O last 3 completed shifts: In: 2320 [P.O.:480; I.V.:1840]  Out: -     Lab Data:   All available labs were reviewed by me today.      CBC:   Recent Labs     06/20/20  2202 06/21/20  1206   WBC 12.8* 13.5*   RBC 4.65 4.41   HGB 14.2 13.6   HCT 42.4 40.7   PLT 81* 82*   MCV 91.2 92.2   MCH 30.5 30.9   MCHC 33.4 33.5   RDW 12.7 13.3   BANDSPCT 4  -- automated transcription, some errors in transcription may have occurred inadvertently. If you may need any clarification, please do not hesitate to contact me through EPIC or at the phone number provided below with my electronic signature. Any pictures or media included in this note were obtained after taking informed verbal consent from the patient and with their approval to include those in the patient's medical record.       Anthony Cleveland MD, MPH  6/21/20, 5:26 PM EDT   [facility] Infectious Disease   Office: [phone]  Fax: [phone]  Tuesday AM clinic:   [address] [place], [place] [address]  Thursday AM clinic: [address] [facility]

## 2020-06-21 NOTE — ED NOTES
Pt telemetry confirmed with Wilder on 3A. Pt report called to Lady Brian RN, states no questions or concerns at this time. Pt transported to floor via wheelchair by ED tech with portable telemetry on throughout transport. Pt transported with normal saline still infusing at time of transfer.       [address], RN  06/21/20 2466

## 2020-06-21 NOTE — PLAN OF CARE
Problem: Fluid and Electrolyte Imbalance  Goal: Balanced intake and output  Outcome: Ongoing  Note: Pt A&OX4, /76   Pulse 103   Temp 98 °F (36.7 °C) (Oral)   Resp 16   Ht 5' 8\" (1.727 m)   Wt 229 lb 3 oz (104 kg)   SpO2 95%   BMI 34.85 kg/m²   No c/o pain, c/o upset stomach, has had diarrhea x4 overnight, cdiff came back negative, iv fluids infusing per order, message sent to NP for imodium, pt resting in bed

## 2020-06-21 NOTE — H&P
Hospital Medicine History and Physical    6/21/2020    Date of Admission: 6/21/2020    Date of Service: Pt seen/examined on 6/21/2020 and admitted to inpatient. Assessment/plan:  Principal Problem:    Acute gastroenteritis  Active Problems:    Steroid-induced leukocytosis    Generalized weakness    Abnormal urinalysis    Hyponatremia    Hypokalemia  Resolved Problems:    * No resolved hospital problems. *      1. Acute gastroenteritis. Continue intravenous fluid, resume p.o. intake in addition to antiemetics. Will check stool study for C. difficile, GI bacterial pathogen (Salmonella, Shigella, Campylobacter). 2. Abnormal urinalysis, abnormal CT-abdomen/pelvis with perinephric stranding. Patient does not have clear urinary symptoms. Abnormal urinalysis could be secondary to dehydration. CT findings are nonspecific. Patient has received 1 g of Rocephin; follow urine culture results. For now, continue intravenous fluid, antibiotic/rocephin. Lactic acid level is normal.  3. Generalized weakness. Likely secondary to dehydration. Continue intravenous fluid as stated above. Maintain on fall precautions. 4. Multiple electrolyte abnormalities: hypokalemia, hyponatremia. Received p.o. potassium in the emergency room. Will continue potassium-continue intravenous fluid. Recheck potassium and magnesium as well as other electrolytes in the morning. 5. Sinus tachycardia. Likely secondary to multiple electrolyte abnormalities, dehydration. Continue intravenous fluid, replace electrolytes. Check TSH. Procalcitonin level also pending to assess for possible bacterial infectious process. 6. Thrombocytopenia. This appears to be new at this time. Will reassess with repeat CBC later this morning. If persists, may need hematology consult to assist with evaluation. 7. Other comorbidities: Tobacco use disorder (counseled about cessation of tobacco use), obesity with BMI of 35 kg/m².     Activities: Up with assist  Prophylaxis: Subcutaneous Lovenox  Code status: Full code    ==========================================================  Chief complaint:  Chief Complaint   Patient presents with    Fatigue     pt states on prednisone for poison ivy, very allergic, has neause, dehydrations, dry mouth and generally feels fatigued and sick       History of Presenting Illness: This is a pleasant 60 y.o. female with no significant past medical history, who was recently diagnosed with poison ivy about 5 to 6 days ago, prescribed Benadryl and steroid, who presents to the emergency room with complaints of several bouts of diarrhea that has been ongoing for the past 3 to 4 days, coupled with nausea without vomiting. She has had subjective fever and chills. She does not have abdominal pain. She denies chest pain or shortness of breath or cough. She also denies urinary symptoms; she does not have dysuria urgency or hesitancy. She has made attempts to try to keep up with fluid intake. On presentation to the emergency room, she was noted to be tachycardic, received a total of 2 L of normal saline bolus, currently getting third liter saline bolus at a time of my evaluation. CT-abdomen/pelvis obtained in the emergency room is remarkable for questionable perinephric stranding. Patient received a dose of Rocephin in the emergency room. She is being admitted for further evaluation. Past Medical History:  History reviewed. No pertinent past medical history. Past Surgical History:  History reviewed. No pertinent surgical history. Medications (prior to admission):  Prior to Admission medications    Medication Sig Start Date End Date Taking?  Authorizing Provider   predniSONE (DELTASONE) 20 MG tablet Take 20 mg by mouth daily   Yes Historical Provider, MD   meloxicam (MOBIC) 15 MG tablet Take 15 mg by mouth daily   Yes Historical Provider, MD   diphenhydrAMINE (BENADRYL) 25 MG capsule Take 25 mg by mouth nightly as needed for Itching   Yes Historical Provider, MD   NONFORMULARY Cream for posion ivy   Yes Historical Provider, MD       Allergy(ies):  Codeine    Social History:  TOBACCO:  reports that she has never smoked. She has never used smokeless tobacco.  ETOH:  reports previous alcohol use. Family History:  History reviewed. No pertinent family history. Review of Systems:  Pertinent positives are listed in HPI. At least 10-point ROS reviewed and were negative. Vitals and physical examination:  /68   Pulse 115   Temp 99.6 °F (37.6 °C) (Oral)   Resp 22   Ht 5' 8\" (1.727 m)   Wt 230 lb (104.3 kg)   SpO2 92%   BMI 34.97 kg/m²   Gen/overall appearance: Not in acute distress. Alert. Head: Normocephalic, atraumatic  Eyes: EOMI, good acuity  ENT: Oral mucosa moist  Neck: No JVD, thyromegaly  CVS: Nml S1S2, no MRG, RRR  Pulm: Clear bilaterally. No crackles/wheezes  Gastrointestinal: Soft, NT/ND, +BS  Musculoskeletal: No edema. Warm  Neuro: No focal deficit. Moves extremity spontaneously. Psychiatry: Appropriate affect. Not agitated. Skin: Warm, dry with normal turgor. No rash  Capillary refill: Brisk,< 3 seconds   Peripheral Pulses: +2 palpable, equal bilaterally       Labs/imaging/EKG:  CBC:   Recent Labs     06/20/20  2202   WBC 12.8*   HGB 14.2   PLT 81*     BMP:    Recent Labs     06/20/20  2202   *   K 3.2*   CL 99   CO2 21   BUN 16   CREATININE 0.7   GLUCOSE 219*     Hepatic:   Recent Labs     06/20/20  2202   AST 18   ALT 12   BILITOT 0.9   ALKPHOS 116       Ct Abdomen Pelvis Wo Contrast    Result Date: 6/20/2020  Bilateral perinephric edema and prominence of the renal collecting systems right greater than left without obstructing lesion or calculus. Findings may represent pyelonephritis. Please correlate with urinalysis and CBC. 9 diverticulosis without evidence of diverticulitis.      Xr Chest Portable    Result Date: 6/20/2020  EXAMINATION: ONE XRAY VIEW OF THE CHEST 6/20/2020 11:16 pm COMPARISON: None. HISTORY: ORDERING SYSTEM PROVIDED HISTORY: fatigue TECHNOLOGIST PROVIDED HISTORY: Reason for exam:->fatigue FINDINGS: Mild central congestion. The heart is not enlarged. No effusion or pneumothorax. Prominence central vessels likely magnified by technique. No focal airspace consolidation. EKG: Sinus tachycardia, rate 128 beats per minutes. Left anterior fascicular block present. Nonspecific ST/T changes. I reviewed EKG. Discussed with ER provider.       Thank you Olga Green MD for the opportunity to be involved in this patient's care.    -----------------------------  Jordan Lyles MD  [facility]

## 2020-06-21 NOTE — ED NOTES
Bed: 27  Expected date:   Expected time:   Means of arrival: [facility]  Comments:     Branden Negro RN  06/20/20 6864

## 2020-06-21 NOTE — PROGRESS NOTES
[facility]    6/21/2020 3:27 PM        Name: Christal Bains .              Admitted: 6/20/2020  Primary Care Provider: Jessica Langford MD (Tel: [phone])      Subjective:  Patient is a 59 yo female with no significant PMH. She is presently taking short course steroid for poison ivy. She presented to ER with c/o fatigue, nausea and diarrhea, associated with subjective fever and chills. She was tachycardic on arrival (132) and received fluids, CT scan suggestive possible pyelonephritis, urine + for leukocytes and nitrites. She has been started on ceftriaxone. Presently resting in bed. States she feels better but still feels as if \"something is wrong. \" She had several diarrhea stools this am but none so far this afternoon. Denies abdominal pain, nausea. Says she is mostly thirsty, not much of an appetite. No ill contacts at home, was camping last week. Stool for c-diff negative, culture results pending. She denies dysuria, frequency.       Reviewed interval ancillary notes    Current Medications  sodium chloride flush 0.9 % injection 10 mL, 2 times per day  sodium chloride flush 0.9 % injection 10 mL, PRN  acetaminophen (TYLENOL) tablet 650 mg, Q6H PRN    Or  acetaminophen (TYLENOL) suppository 650 mg, Q6H PRN  polyethylene glycol (GLYCOLAX) packet 17 g, Daily PRN  promethazine (PHENERGAN) tablet 12.5 mg, Q6H PRN    Or  ondansetron (ZOFRAN) injection 4 mg, Q6H PRN  enoxaparin (LOVENOX) injection 40 mg, Daily  0.9% NaCl with KCl 40 mEq infusion, Continuous  [START ON 6/22/2020] cefTRIAXone (ROCEPHIN) 1 g in sterile water 10 mL IV syringe, Q24H        Objective:  /75   Pulse 95   Temp 98.1 °F (36.7 °C) (Oral)   Resp 18   Ht 5' 8\" (1.727 m)   Wt 229 lb 3 oz (104 kg)   SpO2 95%   BMI 34.85 kg/m²   No intake or output data in the 24 hours ending 06/21/20 0721   Wt Readings from Last 3 Encounters:   06/21/20 229 and thrombocytopenia. She received IVFs, vss (HR 96). 3. Hyponatremia/hypokalemia. Secondary to diarrhea. Continue IVFs until taking adequate po. Continue to monitor. BMP in am.   4. Abnormal UA/CT abd and pelvis. Small leukocytes, positive nitrites on UA, 10-20 WBC on HPF, 1+ bacteria. CT with perinephric stranding which may represent pyelonephritis. Urine cultures results pending. Continue ceftriaxone 1 gram q 24 hours. Procalcitonin 4.06->5. 61. Blood culture results pending. Add lactobacillus. 5. Elevated blood glucose. No hx DM, possibly secondary to steroids. Check A1c. Repeat glucose this afternoon 106. (219 on admission). 6. Thrombocytopenia. Platelet count 16212 on admission, compared to 320390 in April. Suspect this is secondary to infection. Continue to follow. CBC in am. If persists, consider hematology evaluation. Diet: DIET GENERAL;  Code:Full Code  DVT PPX: on enoxaparin      HARISH Goodwin CNP   6/21/2020 3:27 pm    Addendum 6/21/2020 4:40pm.   Received Perfect [employer] message from nursing regarding blood culture results showing gram negative rods, Ecoli DNA in 1 of 2 bottles. Patient presently on ceftriaxone 1 gram, will give an additional 1 gram IV now and increase dose to 2 grams q 24 hours. Consult placed to ID for gram negative bacteremia.   HARISH Gabriel-SHRUTHI

## 2020-06-22 LAB
A/G RATIO: 0.7 (ref 1.1–2.2)
ALBUMIN SERPL-MCNC: 2.9 G/DL (ref 3.4–5)
ALP BLD-CCNC: 95 U/L (ref 40–129)
ALT SERPL-CCNC: 15 U/L (ref 10–40)
ANION GAP SERPL CALCULATED.3IONS-SCNC: 10 MMOL/L (ref 3–16)
AST SERPL-CCNC: 21 U/L (ref 15–37)
BASOPHILS ABSOLUTE: 0 K/UL (ref 0–0.2)
BASOPHILS RELATIVE PERCENT: 0.2 %
BILIRUB SERPL-MCNC: 0.4 MG/DL (ref 0–1)
BUN BLDV-MCNC: 7 MG/DL (ref 7–20)
CALCIUM SERPL-MCNC: 8.9 MG/DL (ref 8.3–10.6)
CHLORIDE BLD-SCNC: 107 MMOL/L (ref 99–110)
CO2: 21 MMOL/L (ref 21–32)
CREAT SERPL-MCNC: 0.9 MG/DL (ref 0.6–1.1)
EOSINOPHILS ABSOLUTE: 0.1 K/UL (ref 0–0.6)
EOSINOPHILS RELATIVE PERCENT: 1.1 %
ESTIMATED AVERAGE GLUCOSE: 105.4 MG/DL
GFR AFRICAN AMERICAN: >60
GFR NON-AFRICAN AMERICAN: >60
GLOBULIN: 4 G/DL
GLUCOSE BLD-MCNC: 126 MG/DL (ref 70–99)
HBA1C MFR BLD: 5.3 %
HCT VFR BLD CALC: 41 % (ref 36–48)
HEMOGLOBIN: 13.4 G/DL (ref 12–16)
LYMPHOCYTES ABSOLUTE: 1.1 K/UL (ref 1–5.1)
LYMPHOCYTES RELATIVE PERCENT: 9.4 %
MCH RBC QN AUTO: 30 PG (ref 26–34)
MCHC RBC AUTO-ENTMCNC: 32.8 G/DL (ref 31–36)
MCV RBC AUTO: 91.7 FL (ref 80–100)
MONOCYTES ABSOLUTE: 1.2 K/UL (ref 0–1.3)
MONOCYTES RELATIVE PERCENT: 10.2 %
NEUTROPHILS ABSOLUTE: 9.5 K/UL (ref 1.7–7.7)
NEUTROPHILS RELATIVE PERCENT: 79.1 %
PDW BLD-RTO: 12.8 % (ref 12.4–15.4)
PLATELET # BLD: 85 K/UL (ref 135–450)
PMV BLD AUTO: 9.3 FL (ref 5–10.5)
POTASSIUM REFLEX MAGNESIUM: 4.3 MMOL/L (ref 3.5–5.1)
RBC # BLD: 4.47 M/UL (ref 4–5.2)
SODIUM BLD-SCNC: 138 MMOL/L (ref 136–145)
TOTAL PROTEIN: 6.9 G/DL (ref 6.4–8.2)
WBC # BLD: 12 K/UL (ref 4–11)

## 2020-06-22 PROCEDURE — 94760 N-INVAS EAR/PLS OXIMETRY 1: CPT

## 2020-06-22 PROCEDURE — 6360000002 HC RX W HCPCS: Performed by: INTERNAL MEDICINE

## 2020-06-22 PROCEDURE — 80053 COMPREHEN METABOLIC PANEL: CPT

## 2020-06-22 PROCEDURE — 6370000000 HC RX 637 (ALT 250 FOR IP): Performed by: INTERNAL MEDICINE

## 2020-06-22 PROCEDURE — 87040 BLOOD CULTURE FOR BACTERIA: CPT

## 2020-06-22 PROCEDURE — 1200000000 HC SEMI PRIVATE

## 2020-06-22 PROCEDURE — 36415 COLL VENOUS BLD VENIPUNCTURE: CPT

## 2020-06-22 PROCEDURE — 6370000000 HC RX 637 (ALT 250 FOR IP): Performed by: NURSE PRACTITIONER

## 2020-06-22 PROCEDURE — 99233 SBSQ HOSP IP/OBS HIGH 50: CPT | Performed by: INTERNAL MEDICINE

## 2020-06-22 PROCEDURE — 85025 COMPLETE CBC W/AUTO DIFF WBC: CPT

## 2020-06-22 PROCEDURE — 2580000003 HC RX 258: Performed by: INTERNAL MEDICINE

## 2020-06-22 PROCEDURE — 83036 HEMOGLOBIN GLYCOSYLATED A1C: CPT

## 2020-06-22 RX ORDER — DIPHENHYDRAMINE HCL 25 MG
25 TABLET ORAL EVERY 6 HOURS PRN
Status: DISCONTINUED | OUTPATIENT
Start: 2020-06-22 | End: 2020-06-23 | Stop reason: HOSPADM

## 2020-06-22 RX ORDER — CETIRIZINE HYDROCHLORIDE 10 MG/1
10 TABLET ORAL DAILY
Status: DISCONTINUED | OUTPATIENT
Start: 2020-06-22 | End: 2020-06-23 | Stop reason: HOSPADM

## 2020-06-22 RX ORDER — DIPHENHYDRAMINE HYDROCHLORIDE, ZINC ACETATE 2; .1 G/100G; G/100G
CREAM TOPICAL 3 TIMES DAILY PRN
Status: DISCONTINUED | OUTPATIENT
Start: 2020-06-22 | End: 2020-06-23 | Stop reason: HOSPADM

## 2020-06-22 RX ORDER — LOPERAMIDE HYDROCHLORIDE 2 MG/1
2 CAPSULE ORAL 3 TIMES DAILY PRN
Status: DISCONTINUED | OUTPATIENT
Start: 2020-06-22 | End: 2020-06-23 | Stop reason: HOSPADM

## 2020-06-22 RX ORDER — CIPROFLOXACIN 2 MG/ML
400 INJECTION, SOLUTION INTRAVENOUS EVERY 12 HOURS
Status: DISCONTINUED | OUTPATIENT
Start: 2020-06-22 | End: 2020-06-23 | Stop reason: HOSPADM

## 2020-06-22 RX ADMIN — Medication 1 CAPSULE: at 08:42

## 2020-06-22 RX ADMIN — ENOXAPARIN SODIUM 40 MG: 40 INJECTION SUBCUTANEOUS at 08:42

## 2020-06-22 RX ADMIN — CETIRIZINE HYDROCHLORIDE 10 MG: 10 TABLET, FILM COATED ORAL at 11:01

## 2020-06-22 RX ADMIN — ACETAMINOPHEN 650 MG: 325 TABLET, FILM COATED ORAL at 00:31

## 2020-06-22 RX ADMIN — CIPROFLOXACIN 400 MG: 2 INJECTION, SOLUTION INTRAVENOUS at 23:29

## 2020-06-22 RX ADMIN — Medication 10 ML: at 10:13

## 2020-06-22 RX ADMIN — LOPERAMIDE HYDROCHLORIDE 2 MG: 2 CAPSULE ORAL at 11:01

## 2020-06-22 RX ADMIN — Medication 1 CAPSULE: at 16:57

## 2020-06-22 RX ADMIN — Medication 10 ML: at 23:29

## 2020-06-22 RX ADMIN — CIPROFLOXACIN 400 MG: 2 INJECTION, SOLUTION INTRAVENOUS at 11:59

## 2020-06-22 ASSESSMENT — PAIN SCALES - GENERAL
PAINLEVEL_OUTOF10: 0

## 2020-06-22 ASSESSMENT — ENCOUNTER SYMPTOMS
RHINORRHEA: 0
FACIAL SWELLING: 0
APNEA: 0
STRIDOR: 0
COUGH: 0
DIARRHEA: 0
TROUBLE SWALLOWING: 0
EYE DISCHARGE: 0
PHOTOPHOBIA: 0
COLOR CHANGE: 0
ABDOMINAL PAIN: 0
CHOKING: 0
CHEST TIGHTNESS: 0
EYE REDNESS: 0
NAUSEA: 0
BLOOD IN STOOL: 0
SHORTNESS OF BREATH: 0

## 2020-06-22 NOTE — PLAN OF CARE
Problem: Falls - Risk of:  Goal: Will remain free from falls  Description: Will remain free from falls  Outcome: Ongoing     Problem: Fluid and Electrolyte Imbalance  Goal: Balanced intake and output  Outcome: Ongoing  Note: Pt c/o diarrhoea x1. No c/o pain, VVS stable. C.DIFF neg. Monitoring I/O striclty. Will continue to monitor.

## 2020-06-22 NOTE — PROGRESS NOTES
Infectious Diseases   Progress Note      Admission Date: 6/20/2020  Hospital Day: Hospital Day: 3   Attending: Adilia Bishop MD  Date of service: 6/22/2020     Chief complaint/ Reason for consult:     · Fever and bandemia  · E. coli bacteremia  · Thrombocytopenia, believed related to infection  · Elevated procalcitonin  · Bilateral perinephric edema concerning for possible pyelonephritis    Microbiology:        I have reviewed allavailable micro lab data and cultures    · Blood culture (2/2) - collected on 6/20/2020: E. coli    · Urine culture  - collected on 6/20/2020: > 100,000 CFU per mL of E. coli      Antibiotics and immunizations:       Current antibiotics: All antibiotics and their doses were reviewed by me    Recent Abx Admin                   cefTRIAXone (ROCEPHIN) 2 g IVPB in D5W 50ml minibag (g) 2 g New Bag 06/21/20 1730                  Immunization History: All immunization history was reviewed by me today. There is no immunization history on file for this patient. Known drug allergies: All allergies were reviewed and updated    Allergies   Allergen Reactions    Codeine Swelling       Social history:     Social History:  All social andepidemiologic history was reviewed and updated by me today as needed. · Tobacco use:   reports that she has never smoked. She has never used smokeless tobacco.  · Alcohol use:   reports previous alcohol use. · Currently lives in: [address] [place] [address]  ·  reports no history of drug use. Assessment:     The patient is a 60 y.o. old female who  has no past medical history on file.  with following problems:    · Fever and bandemia-ongoing  · E. coli bacteremia-ongoing despite IV ceftriaxone  · Thrombocytopenia, believed related to infection-platelet count is 27,796 today  · Elevated procalcitonin  · Bilateral perinephric edema concerning for possible pyelonephritis-urine culture growing E. coli  · Diverticulosis without diverticulitis  · Obesity Class 1 due to excess calorie intake : Body mass index is 34.85 kg/m². Discussion:      Her urine culture is also growing E. Coli, so is the blood culture, urine is a likely source of her E. coli bacteremia. The CT scan was concerning for possible pyelonephritis changes bilaterally    The patient was feeling quite be done for few days before presentation to the hospital.    She is on IV ceftriaxone. Has continued to have fever. White cell count is 12,000. T-max 99.7 today and was 100.4 at around midnight. Serum creatinine 0.9. Platelet count is 89,467    Plan:     Diagnostic Workup:    · Will order 1 set of follow-up blood culture today  · Continue to follow  fever curve, WBC count and blood cultures  · Follow up on E. coli susceptibility  · Follow up on liver and renal function  · Repeat chest x-ray today    Antimicrobials:    · Ongoing fever and leukocytosis despite IV ceftriaxone is concerning  · We will stop IV ceftriaxone today  · Will order IV ciprofloxacin 400 mg every 12 hour  · Continue to monitor vitals closely  · We will follow-up on the E. coli susceptibility data and clinical progress and will make further recommendations accordingly  · Hold off on PICC line  · Maintain good glycemic control  · Fall precautions  · Continue to watch for new fever or diarrhea  · DVT prophylaxis  · Discussed the above plan with patient and RN       Drug Monitoring:    · Continue monitoring for antibiotic toxicity as follows: *CMP, QTc interval  · Continue to watch for following: new or worsening fever, new hypotension, hives, lip swelling and redness or purulence at vascular access sites. Current isolation precautions: There are no current isolations documented for this patient. I/v access Management:    · Continue to monitor i.v access sites for erythema, induration,discharge or tenderness.    · As always, continue efforts to minimize tubes/ lines/ drains as clinically appropriate to reduce chances of line associated infections. Patient education and counseling:     · The patient was educated in detail about the side-effects of various antibiotics and things to watch for like new rashes, lip swelling, severe reaction, worsening diarrhea, break through fever etc.  · Discussed patient's condition and what to expect. All of the patient's questions were addressed in a satisfactory manner and patient verbalized understanding all instructions. Level of complexity of consult: High     Risk of Complications/Morbidity: High     · Illness(es)/ Infection present that pose threat to life/bodily function. · There is potential for severe exacerbation of infection/side effects of treatment. · Therapy requires intensive monitoring for antimicrobial agent toxicity. Weight loss counseling:    Extensive weight loss counseling was done. It is important to set a realistic weight loss goal. First goal should be to avoid gaining more weight and staying at current weight (or within 5 percent). People at high risk of developing diabetes who are able to lose 5 percent of their body weight and maintain this weight will reduce their risk of developing diabetes by about 50 percent and reduce their blood pressure. Losing more than 15 percent of  body weight and staying at this weight is an extremely good result, even if you never reach your \"dream\" or \"ideal\" weight. Lifestyle changes including changing eating habits, substituting excess carbohydrates with proteins, stress reduction, using self-help programs like Weight Watchers®, Overeaters Anonymous®, and Take Off Pounds Sensibly (TOPS)© , following DASH diet and increasing exercise or walking briskly daily for half hour to and hour 5-7 days a week was suggested among other measures.  Information was given about various weight loss education programs and their websites like www.cdc.gov/healthyweight, www.choosemyplate.gov and www.health.gov/dietaryguidelines/      TIME Surgical History: All past surgical history was reviewed today. History reviewed. No pertinent surgical history. Family History: All family history was reviewed today. History reviewed. No pertinent family history. Objective:       PHYSICAL EXAM:      Vitals:   Vitals:    06/22/20 0129 06/22/20 0351 06/22/20 0752 06/22/20 1122   BP:  124/82 128/83 126/73   Pulse:  88 93 89   Resp:  18 16 16   Temp: 99.7 °F (37.6 °C) 98.2 °F (36.8 °C) 99.7 °F (37.6 °C) 98.2 °F (36.8 °C)   TempSrc: Oral Oral Oral Oral   SpO2:  96% 97% 98%   Weight:       Height:           Physical Exam  Vitals signs and nursing note reviewed. Constitutional:       General: She is not in acute distress. Appearance: She is well-developed. She is not diaphoretic. HENT:      Head: Normocephalic. Right Ear: External ear normal.      Left Ear: External ear normal.      Nose: Nose normal.   Eyes:      General: No scleral icterus. Right eye: No discharge. Left eye: No discharge. Conjunctiva/sclera: Conjunctivae normal.      Pupils: Pupils are equal, round, and reactive to light. Neck:      Musculoskeletal: Normal range of motion and neck supple. Cardiovascular:      Rate and Rhythm: Normal rate and regular rhythm. Heart sounds: No murmur. No friction rub. Pulmonary:      Effort: Pulmonary effort is normal.      Breath sounds: No stridor. No wheezing or rales. Chest:      Chest wall: No tenderness. Abdominal:      Palpations: Abdomen is soft. There is no mass. Tenderness: There is no abdominal tenderness. There is no guarding or rebound. Musculoskeletal:         General: No tenderness. Lymphadenopathy:      Cervical: No cervical adenopathy. Skin:     General: Skin is warm and dry. Findings: No erythema or rash. Neurological:      Mental Status: She is alert and oriented to person, place, and time. Motor: No abnormal muscle tone.    Psychiatric:         Judgment: Judgment normal. Lines: All vascular access sites are healthy with no local erythema, discharge or tenderness. Intake and output:    I/O last 3 completed shifts: In: 2320 [P.O.:480; I.V.:1840]  Out: -     Lab Data:   All available labs and old records have been reviewed by me. CBC:  Recent Labs     06/20/20  2202 06/21/20  1206 06/22/20  0615   WBC 12.8* 13.5* 12.0*   RBC 4.65 4.41 4.47   HGB 14.2 13.6 13.4   HCT 42.4 40.7 41.0   PLT 81* 82* 85*   MCV 91.2 92.2 91.7   MCH 30.5 30.9 30.0   MCHC 33.4 33.5 32.8   RDW 12.7 13.3 12.8   BANDSPCT 4  --   --         BMP:  Recent Labs     06/20/20  2202 06/21/20  1206 06/22/20  0615   * 137 138   K 3.2* 4.3 4.3   CL 99 106 107   CO2 21 25 21   BUN 16 10 7   CREATININE 0.7 0.8 0.9   CALCIUM 8.2* 8.4 8.9   GLUCOSE 219* 106* 126*        Hepatic Function Panel:   Lab Results   Component Value Date    ALKPHOS 95 06/22/2020    ALT 15 06/22/2020    AST 21 06/22/2020    PROT 6.9 06/22/2020    BILITOT 0.4 06/22/2020    LABALBU 2.9 06/22/2020       CPK: No results found for: CKTOTAL  ESR: No results found for: SEDRATE  CRP: No results found for: CRP        Imaging: All pertinent images and reports for the current visit were reviewed by me during this visit. CT ABDOMEN PELVIS WO CONTRAST   Final Result   Bilateral perinephric edema and prominence of the renal collecting systems   right greater than left without obstructing lesion or calculus. Findings may   represent pyelonephritis. Please correlate with urinalysis and CBC. Diverticulosis without evidence of diverticulitis. XR CHEST PORTABLE   Final Result   Prominence central vessels likely magnified by technique. No focal airspace consolidation. Medications: All current and past medications were reviewed.      cefTRIAXone (ROCEPHIN) IV  2 g Intravenous Q24H    cetirizine  10 mg Oral Daily    sodium chloride flush  10 mL Intravenous 2 times per day    enoxaparin  40 mg Subcutaneous Daily  lactobacillus  1 capsule Oral BID WC           diphenhydrAMINE, diphenhydrAMINE-zinc acetate, loperamide, sodium chloride flush, acetaminophen **OR** acetaminophen, polyethylene glycol, promethazine **OR** ondansetron      Problem list:       Patient Active Problem List   Diagnosis Code    Acute gastroenteritis K52.9    Steroid-induced leukocytosis D72.829    Generalized weakness R53.1    Abnormal urinalysis R82.90    Hyponatremia E87.1    Hypokalemia E87.6    Acute pyelonephritis N10    E coli bacteremia R78.81    Fever R50.9    Thrombocytopenia (HCC) D69.6    Elevated procalcitonin R79.89    Diverticulosis of colon without diverticulitis K57.30    Class 1 obesity due to excess calories with body mass index (BMI) of 34.0 to 34.9 in adult E66.09, Z68.34       Please note that this chart was generated using Dragon dictation software. Although every effort was made to ensure the accuracy of this automated transcription, some errors in transcription may have occurred inadvertently. If you may need any clarification, please do not hesitate to contact me through EPIC or at the phone number provided below with my electronic signature. Any pictures or media included in this note were obtained after taking informed verbal consent from the patient and with their approval to include those in the patient's medical record.     Jo Delgado MD, MPH  6/22/2020 , 11:26 AM   [facility] Infectious Disease   Office: [phone]  Fax: [phone]  Tuesday AM clinic:   [address] [place] [address]  Thursday AM NEWCRD:[id number] [facility]

## 2020-06-22 NOTE — PROGRESS NOTES
[facility] PROGRESS NOTE    6/22/2020 7:19 AM        Name: Chris Wright .              Admitted: 6/20/2020  Primary Care Provider: Sonny Mcbride MD (Tel: [phone])      Subjective: Keanu Gomes Pt admitted with fatigue and diarrhea after taking steroids for poison ivy    Urine positive for E Coli  1/2 BC also positive   Pt seen this am and requesting to be d/c  She is febrile at intervals  T max 100.4         Reviewed interval ancillary notes    Current Medications  sodium chloride flush 0.9 % injection 10 mL, 2 times per day  sodium chloride flush 0.9 % injection 10 mL, PRN  acetaminophen (TYLENOL) tablet 650 mg, Q6H PRN    Or  acetaminophen (TYLENOL) suppository 650 mg, Q6H PRN  polyethylene glycol (GLYCOLAX) packet 17 g, Daily PRN  promethazine (PHENERGAN) tablet 12.5 mg, Q6H PRN    Or  ondansetron (ZOFRAN) injection 4 mg, Q6H PRN  enoxaparin (LOVENOX) injection 40 mg, Daily  cefTRIAXone (ROCEPHIN) 2 g IVPB in D5W 50ml minibag, Q24H  lactobacillus (CULTURELLE) capsule 1 capsule, BID WC        Objective:  /82   Pulse 88   Temp 98.2 °F (36.8 °C) (Oral)   Resp 18   Ht 5' 8\" (1.727 m)   Wt 229 lb 3 oz (104 kg)   SpO2 96%   BMI 34.85 kg/m²     Intake/Output Summary (Last 24 hours) at 6/22/2020 0719  Last data filed at 6/21/2020 1230  Gross per 24 hour   Intake 2320 ml   Output --   Net 2320 ml      Wt Readings from Last 3 Encounters:   06/21/20 229 lb 3 oz (104 kg)       General appearance:  Appears comfortable  Eyes: Sclera clear. Pupils equal.  ENT: Moist oral mucosa. Trachea midline, no adenopathy. Cardiovascular: Regular rhythm, normal S1, S2. No murmur. No edema in lower extremities  Respiratory: Not using accessory muscles. Good inspiratory effort. Clear to auscultation bilaterally, no wheeze or crackles.    GI: Abdomen soft, no tenderness, not distended, normal bowel sounds  Musculoskeletal: No cyanosis in digits, neck supple  Neurology: CN 2-12 grossly intact. No speech or motor deficits  Psych: Normal affect. Alert and oriented in time, place and person  Skin: Warm, dry, rash from poison ivy noted on right side of back and bilateral forearms     Labs and Tests:  CBC:   Recent Labs     06/20/20  2202 06/21/20  1206 06/22/20  0615   WBC 12.8* 13.5* 12.0*   HGB 14.2 13.6 13.4   PLT 81* 82* 85*     BMP:    Recent Labs     06/20/20 2202 06/21/20  1206   * 137   K 3.2* 4.3   CL 99 106   CO2 21 25   BUN 16 10   CREATININE 0.7 0.8   GLUCOSE 219* 106*     Hepatic:   Recent Labs     06/20/20 2202   AST 18   ALT 12   BILITOT 0.9   ALKPHOS 116        CXR 6/20/2020:  Prominence central vessels likely magnified by technique.       No focal airspace consolidation.      CT Abdomen/Pelvis 6/20/2020:  Bilateral perinephric edema and prominence of the renal collecting systems   right greater than left without obstructing lesion or calculus.  Findings may   represent pyelonephritis.  Please correlate with urinalysis and CBC. Diverticulosis without evidence of diverticulitis. Problem List  Principal Problem:    Acute gastroenteritis  Active Problems:    Steroid-induced leukocytosis    Generalized weakness    Abnormal urinalysis    Hyponatremia    Hypokalemia    Acute pyelonephritis    E coli bacteremia    Fever    Thrombocytopenia (HCC)    Elevated procalcitonin    Diverticulosis of colon without diverticulitis    Class 1 obesity due to excess calories with body mass index (BMI) of 34.0 to 34.9 in adult  Resolved Problems:    * No resolved hospital problems. *       Assessment & Plan:   1. E coli Bacteremia:  Likely due to pyelonephritis/UTI:  Currently on Rocephin 2 gms daily. She is afebrile,  WBC trending down. Sensitivities are pending   2. Sepsis due to bacteremia:  Resolving with supportive care  3. Thrombocytopenia: likely due to infection,  Trending up . Will follow   4. Hyperglycemia:  Likely due to steroids. AIC pending  5. Obesity:  Pt undersatnds the need for weight reduction  6.  Repeat blood cultures were drawn today       Diet: DIET GENERAL;  Code:Full Code  DVT PPX      HARISH Reyna CNP   6/22/2020 7:19 AM

## 2020-06-23 VITALS
RESPIRATION RATE: 16 BRPM | TEMPERATURE: 98.4 F | SYSTOLIC BLOOD PRESSURE: 129 MMHG | WEIGHT: 229.19 LBS | HEIGHT: 68 IN | HEART RATE: 86 BPM | DIASTOLIC BLOOD PRESSURE: 82 MMHG | OXYGEN SATURATION: 96 % | BODY MASS INDEX: 34.74 KG/M2

## 2020-06-23 LAB
CULTURE, BLOOD 2: ABNORMAL
CULTURE, BLOOD 2: ABNORMAL
ORGANISM: ABNORMAL
URINE CULTURE, ROUTINE: ABNORMAL

## 2020-06-23 PROCEDURE — 99232 SBSQ HOSP IP/OBS MODERATE 35: CPT | Performed by: INTERNAL MEDICINE

## 2020-06-23 PROCEDURE — 6370000000 HC RX 637 (ALT 250 FOR IP): Performed by: NURSE PRACTITIONER

## 2020-06-23 PROCEDURE — 2580000003 HC RX 258: Performed by: INTERNAL MEDICINE

## 2020-06-23 PROCEDURE — 94760 N-INVAS EAR/PLS OXIMETRY 1: CPT

## 2020-06-23 PROCEDURE — 6360000002 HC RX W HCPCS: Performed by: INTERNAL MEDICINE

## 2020-06-23 RX ORDER — CIPROFLOXACIN 500 MG/1
500 TABLET, FILM COATED ORAL 2 TIMES DAILY
Qty: 20 TABLET | Refills: 0 | Status: SHIPPED | OUTPATIENT
Start: 2020-06-23 | End: 2020-07-03

## 2020-06-23 RX ORDER — DIPHENHYDRAMINE HYDROCHLORIDE, ZINC ACETATE 2; .1 G/100G; G/100G
CREAM TOPICAL
Qty: 1 TUBE | Refills: 0 | Status: SHIPPED | OUTPATIENT
Start: 2020-06-23

## 2020-06-23 RX ORDER — LACTOBACILLUS RHAMNOSUS GG 10B CELL
1 CAPSULE ORAL 2 TIMES DAILY WITH MEALS
Qty: 20 CAPSULE | Refills: 0 | Status: SHIPPED | OUTPATIENT
Start: 2020-06-23 | End: 2020-07-03

## 2020-06-23 RX ADMIN — ENOXAPARIN SODIUM 40 MG: 40 INJECTION SUBCUTANEOUS at 08:25

## 2020-06-23 RX ADMIN — Medication 1 CAPSULE: at 08:26

## 2020-06-23 RX ADMIN — Medication 10 ML: at 10:09

## 2020-06-23 RX ADMIN — CETIRIZINE HYDROCHLORIDE 10 MG: 10 TABLET, FILM COATED ORAL at 08:25

## 2020-06-23 RX ADMIN — DIPHENHYDRAMINE HYDROCHLORIDE, ZINC ACETATE: 2; .1 CREAM TOPICAL at 00:39

## 2020-06-23 ASSESSMENT — ENCOUNTER SYMPTOMS
EYE REDNESS: 0
SHORTNESS OF BREATH: 0
RHINORRHEA: 0
DIARRHEA: 0
EYE DISCHARGE: 0
BLOOD IN STOOL: 0
COUGH: 0
COLOR CHANGE: 0
APNEA: 0
PHOTOPHOBIA: 0
ABDOMINAL PAIN: 0
CHEST TIGHTNESS: 0
TROUBLE SWALLOWING: 0
NAUSEA: 0
CHOKING: 0
STRIDOR: 0
FACIAL SWELLING: 0

## 2020-06-23 ASSESSMENT — PAIN SCALES - GENERAL
PAINLEVEL_OUTOF10: 0

## 2020-06-23 NOTE — PROGRESS NOTES
Infectious Diseases   Progress Note      Admission Date: 6/20/2020  Hospital Day: Hospital Day: 4   Attending: Alon Freitas MD  Date of service: 6/23/2020     Chief complaint/ Reason for consult:     · Fever and bandemia  · E. coli bacteremia  · Thrombocytopenia, believed related to infection  · Elevated procalcitonin  · Bilateral perinephric edema concerning for possible pyelonephritis    Microbiology:        I have reviewed allavailable micro lab data and cultures    · Blood culture (2/2) - collected on 6/20/2020: E. coli  Escherichia coli (2)     Antibiotic Interpretation CORINA Status    ampicillin Sensitive 8 mcg/mL     ceFAZolin Sensitive <=4 mcg/mL     cefepime Sensitive <=0.12 mcg/mL     cefTRIAXone Sensitive <=0.25 mcg/mL     ciprofloxacin Sensitive <=0.25 mcg/mL     ertapenem Sensitive <=0.12 mcg/mL     gentamicin Sensitive <=1 mcg/mL     levofloxacin Sensitive <=0.12 mcg/mL     piperacillin-tazobactam Sensitive <=4 mcg/mL     trimethoprim-sulfamethoxazole Sensitive <=20 mcg/mL         · Urine culture  - collected on 6/20/2020: > 100,000 CFU per mL of E. coli    Escherichia coli (1)     Antibiotic Interpretation CORINA Status    ampicillin Sensitive 4 mcg/mL     ceFAZolin Sensitive <=4 mcg/mL      NOTE: Cefazolin should only be used for uncomplicated UTI        for E.coli or Klebsiella pneumoniae.    cefepime Sensitive <=0.12 mcg/mL     cefTRIAXone Sensitive <=0.25 mcg/mL     ciprofloxacin Sensitive <=0.25 mcg/mL     ertapenem Sensitive <=0.12 mcg/mL     gentamicin Sensitive <=1 mcg/mL     levofloxacin Sensitive <=0.12 mcg/mL     nitrofurantoin Sensitive <=16 mcg/mL     piperacillin-tazobactam Sensitive <=4 mcg/mL     trimethoprim-sulfamethoxazole Sensitive <=20 mcg/mL         Antibiotics and immunizations:       Current antibiotics: All antibiotics and their doses were reviewed by me    Recent Abx Admin                   ciprofloxacin (CIPRO) IVPB 400 mg (mg) 400 mg New Bag 06/22/20 2322     400 mg New Bag  1159                  Immunization History: All immunization history was reviewed by me today. There is no immunization history on file for this patient. Known drug allergies: All allergies were reviewed and updated    Allergies   Allergen Reactions    Codeine Swelling       Social history:     Social History:  All social andepidemiologic history was reviewed and updated by me today as needed. · Tobacco use:   reports that she has never smoked. She has never used smokeless tobacco.  · Alcohol use:   reports previous alcohol use. · Currently lives in: [address] [place] [address]  ·  reports no history of drug use. Assessment:     The patient is a 60 y.o. old female who  has no past medical history on file. with following problems:    · Fever and bandemia-fever improving  · E. coli bacteremia-currently on IV ciprofloxacin  · Thrombocytopenia, believed related to infection-should be followed up as an outpatient  · Elevated procalcitonin  · Bilateral perinephric edema concerning for possible pyelonephritis-urine culture growing E. coli  · Diverticulosis without diverticulitis  · Obesity Class 1 due to excess calorie intake : Body mass index is 34.85 kg/m². Discussion:      Her fever curve is coming down. Susceptibility of E. coli isolated from the blood and urine cultures reviewed. It is pan susceptible    Plan:     Diagnostic Workup:    · Continue to follow  fever curve, WBC count and blood cultures  · Follow up on liver and renal function    Antimicrobials:    · Okay to switch IV ciprofloxacin to p.o. ciprofloxacin 500 mg every 12 hour  · Continue to monitor her fevers  · If the patient wants to go home, will recommend 10 days of oral ciprofloxacin.   Should continue to watch for fever or diarrhea night sweats etc.  · Discussed the importance of antibiotic compliance  · Discussed all above with patient and RN      Drug Monitoring:    · Continue monitoring for antibiotic toxicity as follows: CMP, QTC interval  · Continue to watch for following: new or worsening fever, new hypotension, hives, lip swelling and redness or purulence at vascular access sites. I/v access Management:    · Continue to monitor i.v access sites for erythema, induration, discharge or tenderness. · As always, continue efforts to minimize tubes/lines/drains as clinically appropriate to reduce chances of line associated infections. Patient education and counseling:        · The patient was educated in detail about the side-effects of various antibiotics and things to watch for like new rashes, lip swelling, severe reaction, worsening diarrhea, break through fever etc.  · Discussed patient's condition and what to expect. All of the patient's questions were addressed in a satisfactory manner and patient verbalized understanding all instructions. Fluoroquinolone related instructions:     Patient instructed to watch for low or high blood sugars, muscle pains and ankle tendon pain while on Ciprofloxacin or Levofloxacin. Patient was advised to keep a sugar candy at all times as all fluoroquinolones have the potential of causing hypoglycemia. If these symptoms develops, patient was instructed to stop the antibiotic and call my office at [phone]. Use sunscreen when going in bright sun while on Ciprofloxacin or Levofloxacin as these antibiotics can cause photosensitivity. Take 1 hour before or 2 hour after dairy, calcium, iron, magnesium, aluminum or zinc.      Thank you for involving me in the care of your patient. I will continue to follow. If you have anyadditional questions, please do not hesitate to contact me. Subjective: Interval history: Interval history was obtained from chart review and RN. She is afebrile. Currently on IV ciprofloxacin. Tolerating it okay. REVIEW OF SYSTEMS:     Review of Systems   Constitutional: Negative for chills, diaphoresis and unexpected weight change.    HENT: HENT:      Head: Normocephalic. Right Ear: External ear normal.      Left Ear: External ear normal.      Nose: Nose normal.   Eyes:      General: No scleral icterus. Right eye: No discharge. Left eye: No discharge. Conjunctiva/sclera: Conjunctivae normal.      Pupils: Pupils are equal, round, and reactive to light. Neck:      Musculoskeletal: Normal range of motion and neck supple. Cardiovascular:      Rate and Rhythm: Normal rate and regular rhythm. Heart sounds: No murmur. No friction rub. Pulmonary:      Effort: Pulmonary effort is normal.      Breath sounds: No stridor. No wheezing or rales. Chest:      Chest wall: No tenderness. Abdominal:      Palpations: Abdomen is soft. There is no mass. Tenderness: There is no abdominal tenderness. There is no guarding or rebound. Musculoskeletal:         General: No tenderness. Lymphadenopathy:      Cervical: No cervical adenopathy. Skin:     General: Skin is warm and dry. Findings: No erythema or rash. Neurological:      Mental Status: She is alert and oriented to person, place, and time. Motor: No abnormal muscle tone. Psychiatric:         Judgment: Judgment normal.            Lines: All vascular access sites are healthy with no local erythema, discharge or tenderness. Intake and output:    No intake/output data recorded. Lab Data:   All available labs and old records have been reviewed by me.     CBC:  Recent Labs     06/20/20 2202 06/21/20  1206 06/22/20  0615   WBC 12.8* 13.5* 12.0*   RBC 4.65 4.41 4.47   HGB 14.2 13.6 13.4   HCT 42.4 40.7 41.0   PLT 81* 82* 85*   MCV 91.2 92.2 91.7   MCH 30.5 30.9 30.0   MCHC 33.4 33.5 32.8   RDW 12.7 13.3 12.8   BANDSPCT 4  --   --         BMP:  Recent Labs     06/20/20 2202 06/21/20  1206 06/22/20  0615   * 137 138   K 3.2* 4.3 4.3   CL 99 106 107   CO2 21 25 21   BUN 16 10 7   CREATININE 0.7 0.8 0.9   CALCIUM 8.2* 8.4 8.9   GLUCOSE 219* 106* 126* Hepatic Function Panel:   Lab Results   Component Value Date    ALKPHOS 95 06/22/2020    ALT 15 06/22/2020    AST 21 06/22/2020    PROT 6.9 06/22/2020    BILITOT 0.4 06/22/2020    LABALBU 2.9 06/22/2020       CPK: No results found for: CKTOTAL  ESR: No results found for: SEDRATE  CRP: No results found for: CRP        Imaging: All pertinent images and reports for the current visit were reviewed by me during this visit. CT ABDOMEN PELVIS WO CONTRAST   Final Result   Bilateral perinephric edema and prominence of the renal collecting systems   right greater than left without obstructing lesion or calculus. Findings may   represent pyelonephritis. Please correlate with urinalysis and CBC. Diverticulosis without evidence of diverticulitis. XR CHEST PORTABLE   Final Result   Prominence central vessels likely magnified by technique. No focal airspace consolidation. XR CHEST STANDARD (2 VW)    (Results Pending)       Medications: All current and past medications were reviewed.      cetirizine  10 mg Oral Daily    ciprofloxacin  400 mg Intravenous Q12H    sodium chloride flush  10 mL Intravenous 2 times per day    enoxaparin  40 mg Subcutaneous Daily    lactobacillus  1 capsule Oral BID WC           diphenhydrAMINE, diphenhydrAMINE-zinc acetate, loperamide, sodium chloride flush, acetaminophen **OR** acetaminophen, polyethylene glycol, promethazine **OR** ondansetron      Problem list:       Patient Active Problem List   Diagnosis Code    Acute gastroenteritis K52.9    Steroid-induced leukocytosis D72.829    Generalized weakness R53.1    Abnormal urinalysis R82.90    Hyponatremia E87.1    Hypokalemia E87.6    Acute pyelonephritis N10    E coli bacteremia R78.81    Fever R50.9    Thrombocytopenia (HCC) D69.6    Elevated procalcitonin R79.89    Diverticulosis of colon without diverticulitis K57.30    Class 1 obesity due to excess calories with body mass index (BMI) of 34.0 to 34.9 in adult E66.09, Z68.34    Weight loss counseling, encounter for Z71.3       Please note that this chart was generated using Dragon dictation software. Although every effort was made to ensure the accuracy of this automated transcription, some errors in transcription may have occurred inadvertently. If you may need any clarification, please do not hesitate to contact me through EPIC or at the phone number provided below with my electronic signature. Any pictures or media included in this note were obtained after taking informed verbal consent from the patient and with their approval to include those in the patient's medical record.     Nikki Diallo MD, MPH  6/23/2020 , 8:55 AM   [facility] Infectious Disease   Office: [phone]  Fax: [phone]  Tuesday AM clinic:   [address] [place] [address] 120  Thursday AM [id number] Bess, [facility]

## 2020-06-23 NOTE — DISCHARGE SUMMARY
distended, normal bowel sounds  Neurology. Facial symmetry. No speech deficits. Moving all extremities equally. Extremities. No edema in lower extremities. Skin. Warm, dry, normal turgor    Condition at time of discharge stable     Medication instructions provided to patient at discharge. Medication List      START taking these medications    ciprofloxacin 500 MG tablet  Commonly known as:  CIPRO  Take 1 tablet by mouth 2 times daily for 10 days  Notes to patient:  Use: to treat infection  Side effects: headace,diarrhea, nausea. diphenhydrAMINE-zinc acetate 2-0.1 % cream  Apply topically 3 times daily as needed. Notes to patient:  Use: Allergic reactions  Side effects: Sedation, sleepiness, dizziness     lactobacillus capsule  Take 1 capsule by mouth 2 times daily (with meals) for 10 days May substitue for any cost effective pro biotic  Notes to patient:  Use: probiotics, supplement  Side effects: bloating, diarrhoea, stomach upset. CONTINUE taking these medications    diphenhydrAMINE 25 MG capsule  Commonly known as:  BENADRYL     meloxicam 15 MG tablet  Commonly known as:  MOBIC  Notes to patient:  Use: treat  of arthritis, pain  Side Effects: Diarrhea, constipation, flatulence, upset stomach        STOP taking these medications    NONFORMULARY     predniSONE 20 MG tablet  Commonly known as:  Evelyn Blunt           Where to Get Your Medications      These medications were sent to [facility], [address] [place] [id number]    Phone:  [phone]   · ciprofloxacin 500 MG tablet  · lactobacillus capsule     You can get these medications from any pharmacy    Bring a paper prescription for each of these medications  · diphenhydrAMINE-zinc acetate 2-0.1 % cream         Discharge recommendations given to patient. Follow Up. in 1 week   Disposition. Home   Activity.  As tolerated   Diet: DIET GENERAL;      Spent > 30 minutes in discharge process.     Signed:  HARISH Mckeon - CNP     6/23/2020 7:23 AM

## 2020-06-23 NOTE — PROGRESS NOTES
CLINICAL PHARMACY NOTE: MEDS TO [address] [place] [address] [employer] Patient?: No  Total # of Prescriptions Filled: 2   The following medications were delivered to the patient:  · Probiotic  · Cipro 500mg  Total # of Interventions Completed: 0  Time Spent (min): 15    Additional Documentation:    Delivered to Patient    Loob Christiansen CPhT

## 2020-06-23 NOTE — PLAN OF CARE
Problem: Falls - Risk of:  Goal: Will remain free from falls  Description: Will remain free from falls  Outcome: Met This Shift     Problem: Fluid and Electrolyte Imbalance  Goal: Balanced intake and output  Outcome: Met This Shift

## 2020-06-25 LAB — BLOOD CULTURE, ROUTINE: NORMAL

## 2020-06-26 LAB — BLOOD CULTURE, ROUTINE: NORMAL
